# Patient Record
Sex: FEMALE | Race: BLACK OR AFRICAN AMERICAN | NOT HISPANIC OR LATINO | Employment: UNEMPLOYED | URBAN - METROPOLITAN AREA
[De-identification: names, ages, dates, MRNs, and addresses within clinical notes are randomized per-mention and may not be internally consistent; named-entity substitution may affect disease eponyms.]

---

## 2022-02-10 ENCOUNTER — HOSPITAL ENCOUNTER (INPATIENT)
Facility: HOSPITAL | Age: 57
LOS: 1 days | Discharge: HOME | DRG: 517 | End: 2022-02-11
Attending: EMERGENCY MEDICINE | Admitting: ORTHOPAEDIC SURGERY
Payer: MEDICAID

## 2022-02-10 ENCOUNTER — ANESTHESIA EVENT (OUTPATIENT)
Dept: SURGERY | Facility: HOSPITAL | Age: 57
DRG: 517 | End: 2022-02-10
Payer: MEDICAID

## 2022-02-10 ENCOUNTER — ANESTHESIA (OUTPATIENT)
Dept: SURGERY | Facility: HOSPITAL | Age: 57
DRG: 517 | End: 2022-02-10
Payer: MEDICAID

## 2022-02-10 DIAGNOSIS — S61.322A: Primary | ICD-10-CM

## 2022-02-10 DIAGNOSIS — T14.8XXA OPEN FRACTURE: ICD-10-CM

## 2022-02-10 DIAGNOSIS — Z01.810 PREOP CARDIOVASCULAR EXAM: ICD-10-CM

## 2022-02-10 DIAGNOSIS — S68.629A PARTIAL TRAUMATIC AMPUTATION OF FINGER THROUGH PHALANX, INITIAL ENCOUNTER: ICD-10-CM

## 2022-02-10 PROBLEM — S62.639B: Status: ACTIVE | Noted: 2022-02-10

## 2022-02-10 LAB
ALBUMIN SERPL BCP-MCNC: 3.8 G/DL (ref 3.5–5.2)
ALP SERPL-CCNC: 77 U/L (ref 55–135)
ALT SERPL W/O P-5'-P-CCNC: 14 U/L (ref 10–44)
ANION GAP SERPL CALC-SCNC: 8 MMOL/L (ref 8–16)
AST SERPL-CCNC: 28 U/L (ref 10–40)
BASOPHILS # BLD AUTO: 0.03 K/UL (ref 0–0.2)
BASOPHILS NFR BLD: 0.5 % (ref 0–1.9)
BILIRUB SERPL-MCNC: 0.4 MG/DL (ref 0.1–1)
BUN SERPL-MCNC: 12 MG/DL (ref 6–20)
CALCIUM SERPL-MCNC: 9.2 MG/DL (ref 8.7–10.5)
CHLORIDE SERPL-SCNC: 104 MMOL/L (ref 95–110)
CO2 SERPL-SCNC: 27 MMOL/L (ref 23–29)
CREAT SERPL-MCNC: 0.7 MG/DL (ref 0.5–1.4)
CTP QC/QA: YES
DIFFERENTIAL METHOD: ABNORMAL
EOSINOPHIL # BLD AUTO: 0.1 K/UL (ref 0–0.5)
EOSINOPHIL NFR BLD: 0.8 % (ref 0–8)
ERYTHROCYTE [DISTWIDTH] IN BLOOD BY AUTOMATED COUNT: 13 % (ref 11.5–14.5)
EST. GFR  (AFRICAN AMERICAN): >60 ML/MIN/1.73 M^2
EST. GFR  (NON AFRICAN AMERICAN): >60 ML/MIN/1.73 M^2
GLUCOSE SERPL-MCNC: 119 MG/DL (ref 70–110)
HCT VFR BLD AUTO: 38.8 % (ref 37–48.5)
HGB BLD-MCNC: 12.6 G/DL (ref 12–16)
IMM GRANULOCYTES # BLD AUTO: 0.02 K/UL (ref 0–0.04)
IMM GRANULOCYTES NFR BLD AUTO: 0.3 % (ref 0–0.5)
LYMPHOCYTES # BLD AUTO: 1.5 K/UL (ref 1–4.8)
LYMPHOCYTES NFR BLD: 23 % (ref 18–48)
MCH RBC QN AUTO: 31.4 PG (ref 27–31)
MCHC RBC AUTO-ENTMCNC: 32.5 G/DL (ref 32–36)
MCV RBC AUTO: 97 FL (ref 82–98)
MONOCYTES # BLD AUTO: 0.5 K/UL (ref 0.3–1)
MONOCYTES NFR BLD: 6.8 % (ref 4–15)
NEUTROPHILS # BLD AUTO: 4.5 K/UL (ref 1.8–7.7)
NEUTROPHILS NFR BLD: 68.6 % (ref 38–73)
NRBC BLD-RTO: 0 /100 WBC
PLATELET # BLD AUTO: 268 K/UL (ref 150–450)
PMV BLD AUTO: 10 FL (ref 9.2–12.9)
POTASSIUM SERPL-SCNC: 3.7 MMOL/L (ref 3.5–5.1)
PROT SERPL-MCNC: 7.6 G/DL (ref 6–8.4)
RBC # BLD AUTO: 4.01 M/UL (ref 4–5.4)
SARS-COV-2 RDRP RESP QL NAA+PROBE: NEGATIVE
SODIUM SERPL-SCNC: 139 MMOL/L (ref 136–145)
WBC # BLD AUTO: 6.6 K/UL (ref 3.9–12.7)

## 2022-02-10 PROCEDURE — 11000001 HC ACUTE MED/SURG PRIVATE ROOM

## 2022-02-10 PROCEDURE — 93005 ELECTROCARDIOGRAM TRACING: CPT

## 2022-02-10 PROCEDURE — 63600175 PHARM REV CODE 636 W HCPCS: Performed by: ORTHOPAEDIC SURGERY

## 2022-02-10 PROCEDURE — D9220A PRA ANESTHESIA: Mod: ANES,,, | Performed by: ANESTHESIOLOGY

## 2022-02-10 PROCEDURE — 71000033 HC RECOVERY, INTIAL HOUR: Performed by: ORTHOPAEDIC SURGERY

## 2022-02-10 PROCEDURE — 25000003 PHARM REV CODE 250: Performed by: ORTHOPAEDIC SURGERY

## 2022-02-10 PROCEDURE — 63600175 PHARM REV CODE 636 W HCPCS: Performed by: EMERGENCY MEDICINE

## 2022-02-10 PROCEDURE — 63600175 PHARM REV CODE 636 W HCPCS: Performed by: STUDENT IN AN ORGANIZED HEALTH CARE EDUCATION/TRAINING PROGRAM

## 2022-02-10 PROCEDURE — 93010 ELECTROCARDIOGRAM REPORT: CPT | Mod: ,,, | Performed by: INTERNAL MEDICINE

## 2022-02-10 PROCEDURE — 37000009 HC ANESTHESIA EA ADD 15 MINS: Performed by: ORTHOPAEDIC SURGERY

## 2022-02-10 PROCEDURE — 80053 COMPREHEN METABOLIC PANEL: CPT | Performed by: EMERGENCY MEDICINE

## 2022-02-10 PROCEDURE — 25000003 PHARM REV CODE 250: Performed by: EMERGENCY MEDICINE

## 2022-02-10 PROCEDURE — 25000003 PHARM REV CODE 250: Performed by: STUDENT IN AN ORGANIZED HEALTH CARE EDUCATION/TRAINING PROGRAM

## 2022-02-10 PROCEDURE — 93010 EKG 12-LEAD: ICD-10-PCS | Mod: ,,, | Performed by: INTERNAL MEDICINE

## 2022-02-10 PROCEDURE — 85025 COMPLETE CBC W/AUTO DIFF WBC: CPT | Performed by: EMERGENCY MEDICINE

## 2022-02-10 PROCEDURE — 36000706: Performed by: ORTHOPAEDIC SURGERY

## 2022-02-10 PROCEDURE — 96368 THER/DIAG CONCURRENT INF: CPT

## 2022-02-10 PROCEDURE — 36000707: Performed by: ORTHOPAEDIC SURGERY

## 2022-02-10 PROCEDURE — U0002 COVID-19 LAB TEST NON-CDC: HCPCS | Performed by: EMERGENCY MEDICINE

## 2022-02-10 PROCEDURE — D9220A PRA ANESTHESIA: Mod: CRNA,,, | Performed by: NURSE ANESTHETIST, CERTIFIED REGISTERED

## 2022-02-10 PROCEDURE — D9220A PRA ANESTHESIA: ICD-10-PCS | Mod: ANES,,, | Performed by: ANESTHESIOLOGY

## 2022-02-10 PROCEDURE — 37000008 HC ANESTHESIA 1ST 15 MINUTES: Performed by: ORTHOPAEDIC SURGERY

## 2022-02-10 PROCEDURE — 96372 THER/PROPH/DIAG INJ SC/IM: CPT

## 2022-02-10 PROCEDURE — 99291 CRITICAL CARE FIRST HOUR: CPT | Mod: 25

## 2022-02-10 PROCEDURE — D9220A PRA ANESTHESIA: ICD-10-PCS | Mod: CRNA,,, | Performed by: NURSE ANESTHETIST, CERTIFIED REGISTERED

## 2022-02-10 PROCEDURE — S0030 INJECTION, METRONIDAZOLE: HCPCS | Performed by: EMERGENCY MEDICINE

## 2022-02-10 PROCEDURE — 96365 THER/PROPH/DIAG IV INF INIT: CPT

## 2022-02-10 RX ORDER — MORPHINE SULFATE 4 MG/ML
4 INJECTION, SOLUTION INTRAMUSCULAR; INTRAVENOUS
Status: COMPLETED | OUTPATIENT
Start: 2022-02-10 | End: 2022-02-10

## 2022-02-10 RX ORDER — MORPHINE SULFATE 4 MG/ML
2 INJECTION, SOLUTION INTRAMUSCULAR; INTRAVENOUS
Status: DISCONTINUED | OUTPATIENT
Start: 2022-02-10 | End: 2022-02-11 | Stop reason: HOSPADM

## 2022-02-10 RX ORDER — HYDROMORPHONE HYDROCHLORIDE 2 MG/ML
0.2 INJECTION, SOLUTION INTRAMUSCULAR; INTRAVENOUS; SUBCUTANEOUS EVERY 5 MIN PRN
Status: DISCONTINUED | OUTPATIENT
Start: 2022-02-10 | End: 2022-02-10 | Stop reason: HOSPADM

## 2022-02-10 RX ORDER — PROPOFOL 10 MG/ML
VIAL (ML) INTRAVENOUS
Status: DISCONTINUED | OUTPATIENT
Start: 2022-02-10 | End: 2022-02-10

## 2022-02-10 RX ORDER — SODIUM CHLORIDE 0.9 % (FLUSH) 0.9 %
10 SYRINGE (ML) INJECTION
Status: DISCONTINUED | OUTPATIENT
Start: 2022-02-10 | End: 2022-02-10 | Stop reason: HOSPADM

## 2022-02-10 RX ORDER — BUPIVACAINE HYDROCHLORIDE 5 MG/ML
INJECTION, SOLUTION EPIDURAL; INTRACAUDAL
Status: DISCONTINUED | OUTPATIENT
Start: 2022-02-10 | End: 2022-02-10 | Stop reason: HOSPADM

## 2022-02-10 RX ORDER — LIDOCAINE HYDROCHLORIDE AND EPINEPHRINE 20; 10 MG/ML; UG/ML
10 INJECTION, SOLUTION INFILTRATION; PERINEURAL ONCE
Status: DISCONTINUED | OUTPATIENT
Start: 2022-02-10 | End: 2022-02-10

## 2022-02-10 RX ORDER — FENTANYL CITRATE 50 UG/ML
INJECTION, SOLUTION INTRAMUSCULAR; INTRAVENOUS
Status: DISCONTINUED | OUTPATIENT
Start: 2022-02-10 | End: 2022-02-10

## 2022-02-10 RX ORDER — MIDAZOLAM HYDROCHLORIDE 1 MG/ML
INJECTION INTRAMUSCULAR; INTRAVENOUS
Status: DISCONTINUED | OUTPATIENT
Start: 2022-02-10 | End: 2022-02-10

## 2022-02-10 RX ORDER — LIDOCAINE HYDROCHLORIDE AND EPINEPHRINE 10; 10 MG/ML; UG/ML
10 INJECTION, SOLUTION INFILTRATION; PERINEURAL ONCE
Status: DISCONTINUED | OUTPATIENT
Start: 2022-02-10 | End: 2022-02-10

## 2022-02-10 RX ORDER — PHENYLEPHRINE HYDROCHLORIDE 10 MG/ML
INJECTION INTRAVENOUS
Status: DISCONTINUED | OUTPATIENT
Start: 2022-02-10 | End: 2022-02-10

## 2022-02-10 RX ORDER — ONDANSETRON 2 MG/ML
INJECTION INTRAMUSCULAR; INTRAVENOUS
Status: DISCONTINUED | OUTPATIENT
Start: 2022-02-10 | End: 2022-02-10

## 2022-02-10 RX ORDER — LIDOCAINE HYDROCHLORIDE 20 MG/ML
INJECTION INTRAVENOUS
Status: DISCONTINUED | OUTPATIENT
Start: 2022-02-10 | End: 2022-02-10

## 2022-02-10 RX ORDER — ONDANSETRON 2 MG/ML
4 INJECTION INTRAMUSCULAR; INTRAVENOUS DAILY PRN
Status: DISCONTINUED | OUTPATIENT
Start: 2022-02-10 | End: 2022-02-10 | Stop reason: HOSPADM

## 2022-02-10 RX ORDER — HYDROCODONE BITARTRATE AND ACETAMINOPHEN 7.5; 325 MG/1; MG/1
1 TABLET ORAL EVERY 4 HOURS PRN
Status: DISCONTINUED | OUTPATIENT
Start: 2022-02-10 | End: 2022-02-11 | Stop reason: HOSPADM

## 2022-02-10 RX ORDER — METRONIDAZOLE 500 MG/100ML
500 INJECTION, SOLUTION INTRAVENOUS ONCE
Status: COMPLETED | OUTPATIENT
Start: 2022-02-10 | End: 2022-02-10

## 2022-02-10 RX ADMIN — FENTANYL CITRATE 25 MCG: 50 INJECTION, SOLUTION INTRAMUSCULAR; INTRAVENOUS at 02:02

## 2022-02-10 RX ADMIN — PIPERACILLIN AND TAZOBACTAM 4.5 G: 4; .5 INJECTION, POWDER, LYOPHILIZED, FOR SOLUTION INTRAVENOUS; PARENTERAL at 06:02

## 2022-02-10 RX ADMIN — PROPOFOL 70 MG: 10 INJECTION, EMULSION INTRAVENOUS at 02:02

## 2022-02-10 RX ADMIN — SODIUM CHLORIDE, SODIUM LACTATE, POTASSIUM CHLORIDE, AND CALCIUM CHLORIDE: .6; .31; .03; .02 INJECTION, SOLUTION INTRAVENOUS at 02:02

## 2022-02-10 RX ADMIN — PROPOFOL 40 MG: 10 INJECTION, EMULSION INTRAVENOUS at 02:02

## 2022-02-10 RX ADMIN — MORPHINE SULFATE 4 MG: 4 INJECTION INTRAVENOUS at 11:02

## 2022-02-10 RX ADMIN — MIDAZOLAM HYDROCHLORIDE 2 MG: 1 INJECTION, SOLUTION INTRAMUSCULAR; INTRAVENOUS at 02:02

## 2022-02-10 RX ADMIN — PROPOFOL 50 MG: 10 INJECTION, EMULSION INTRAVENOUS at 02:02

## 2022-02-10 RX ADMIN — CEFTRIAXONE 2 G: 2 INJECTION, SOLUTION INTRAVENOUS at 11:02

## 2022-02-10 RX ADMIN — METRONIDAZOLE 500 MG: 500 INJECTION, SOLUTION INTRAVENOUS at 11:02

## 2022-02-10 RX ADMIN — PROPOFOL 70 MG: 10 INJECTION, EMULSION INTRAVENOUS at 03:02

## 2022-02-10 RX ADMIN — LIDOCAINE HYDROCHLORIDE 100 MG: 20 INJECTION, SOLUTION INTRAVENOUS at 02:02

## 2022-02-10 RX ADMIN — ONDANSETRON 4 MG: 2 INJECTION, SOLUTION INTRAMUSCULAR; INTRAVENOUS at 02:02

## 2022-02-10 RX ADMIN — PHENYLEPHRINE HYDROCHLORIDE 200 MCG: 10 INJECTION INTRAVENOUS at 02:02

## 2022-02-10 RX ADMIN — PROPOFOL 30 MG: 10 INJECTION, EMULSION INTRAVENOUS at 02:02

## 2022-02-10 NOTE — ANESTHESIA PREPROCEDURE EVALUATION
02/10/2022  Jocelyne Garrett is a 56 y.o., female.  To undergo Procedure(s) (LRB):  INCISION AND DRAINAGE (Left)     Denies CP/SOB/GERD/MI/CVA/URI symptoms.  METS > 4  NPO > 8    Past Medical History:  History reviewed. No pertinent past medical history.    Past Surgical History:  History reviewed. No pertinent surgical history.    Social History:  Social History     Socioeconomic History    Marital status: Single   Tobacco Use    Smoking status: Current Every Day Smoker     Packs/day: 0.50     Types: Cigarettes   Substance and Sexual Activity    Alcohol use: Yes     Comment: occassionally    Drug use: Never       Medications:  No current facility-administered medications on file prior to encounter.     No current outpatient medications on file prior to encounter.       Allergies:  Review of patient's allergies indicates:  No Known Allergies    Active Problems:  There is no problem list on file for this patient.      Diagnostic Studies:    CBC:  Recent Labs   Lab 02/10/22  1113   WBC 6.60   RBC 4.01   HGB 12.6   HCT 38.8      MCV 97   MCH 31.4*   MCHC 32.5        CMP:  Recent Labs   Lab 02/10/22  1113   CALCIUM 9.2   PROT 7.6      K 3.7   CO2 27      BUN 12   CREATININE 0.7   ALKPHOS 77   ALT 14   AST 28   BILITOT 0.4     24 Hour Vitals:  Temp:  [36.9 °C (98.5 °F)] 36.9 °C (98.5 °F)  Pulse:  [119] 119  Resp:  [18] 18  SpO2:  [98 %] 98 %  BP: (133)/(81) 133/81   See Nursing Charting For Additional Vitals    Anesthesia Evaluation    I have reviewed the Patient Summary Reports.    I have reviewed the Nursing Notes.       Review of Systems  Anesthesia Hx:  No problems with previous Anesthesia   Denies Personal Hx of Anesthesia complications.   Social:  Non-Smoker    Hematology/Oncology:  Hematology Normal   Oncology Normal     EENT/Dental:EENT/Dental Normal   Cardiovascular:  Cardiovascular  Normal Exercise tolerance: good     Pulmonary:  Pulmonary Normal    Renal/:  Renal/ Normal     Hepatic/GI:  Hepatic/GI Normal    Musculoskeletal:   L hand laceration   Neurological:  Neurology Normal    Endocrine:  Endocrine Normal    Dermatological:  Skin Normal    Psych:  Psychiatric Normal           Physical Exam  General:  Obesity    Airway/Jaw/Neck:  AIRWAY FINDINGS: Normal MP2, TMD > 3FB, poor dentition    Dental:  DENTAL FINDINGS: Normal   Chest/Lungs:  Chest/Lungs Clear    Heart/Vascular:  Heart Findings: Normal       Mental Status:  Mental Status Findings:  Cooperative, Alert and Oriented         Anesthesia Plan  Type of Anesthesia, risks & benefits discussed:  Anesthesia Type:  general, MAC    Patient's Preference:   Plan Factors:          Intra-op Monitoring Plan: standard ASA monitors  Intra-op Monitoring Plan Comments:   Post Op Pain Control Plan: multimodal analgesia  Post Op Pain Control Plan Comments:     Induction:   IV  Beta Blocker:  Patient is not currently on a Beta-Blocker (No further documentation required).       Informed Consent: Patient understands risks and agrees with Anesthesia plan.  Questions answered. Anesthesia consent signed with patient.  ASA Score: 2     Day of Surgery Review of History & Physical:            Ready For Surgery From Anesthesia Perspective.

## 2022-02-10 NOTE — ED PROVIDER NOTES
Encounter Date: 2/10/2022       History     Chief Complaint   Patient presents with    Laceration     Patient reports cutting her finger on the lawnmower blade while cutting grass. Patient states that she believes the tip of her finger is off. She reports that her last Tetanus was about 10 years ago. Bleeding is controlled.      56-year-old female otherwise healthy presenting with laceration to right 2nd and 3rd fingers following accident with .  Patient reports she was attempting to clear something from the bottom of a lawnmower and the blade hit her fingers.  She notes significant opened fracture deformity to fingers.  Notes pain.  Reports bleeding controlled.  Denies fevers or chills.  Previously in usual state of health.        Review of patient's allergies indicates:  No Known Allergies  History reviewed. No pertinent past medical history.  History reviewed. No pertinent surgical history.  History reviewed. No pertinent family history.  Social History     Tobacco Use    Smoking status: Current Every Day Smoker     Packs/day: 0.50     Types: Cigarettes   Substance Use Topics    Alcohol use: Yes     Comment: occassionally    Drug use: Never     Review of Systems   Constitutional: Negative for fever.   Respiratory: Negative for shortness of breath.    Gastrointestinal: Negative for nausea and vomiting.   Musculoskeletal: Positive for arthralgias.   Skin: Positive for wound. Negative for rash.       Physical Exam     Initial Vitals [02/10/22 0915]   BP Pulse Resp Temp SpO2   133/81 (!) 119 18 98.5 °F (36.9 °C) 98 %      MAP       --         Physical Exam    Nursing note and vitals reviewed.  Constitutional: She appears well-developed and well-nourished. She is not diaphoretic. No distress.   HENT:   Head: Normocephalic and atraumatic.   Right Ear: External ear normal.   Left Ear: External ear normal.   Mouth/Throat: No oropharyngeal exudate.   Eyes: EOM are normal. Pupils are equal, round, and  reactive to light. Right eye exhibits no discharge. Left eye exhibits no discharge.   Neck: No JVD present.   Cardiovascular: Normal rate and intact distal pulses.   Pulmonary/Chest: No respiratory distress.   Abdominal: She exhibits no distension. There is no guarding.   Musculoskeletal:         General: Tenderness and edema present.      Comments: Significant laceration and deformity to distal 2nd and 3rd fingers     Neurological: She is alert and oriented to person, place, and time. GCS score is 15. GCS eye subscore is 4. GCS verbal subscore is 5. GCS motor subscore is 6.   Skin: Skin is warm and dry.   Significant laceration and mangled skin of distal 2nd and 3rd fingers   Psychiatric: She has a normal mood and affect. Her behavior is normal.         ED Course   Critical Care    Date/Time: 2/10/2022 10:45 AM  Performed by: Benedict Rowe MD  Authorized by: Benedict Rowe MD   Direct patient critical care time: 30 minutes  Total critical care time (exclusive of procedural time) : 30 minutes  Critical care was necessary to treat or prevent imminent or life-threatening deterioration of the following conditions: trauma.        Labs Reviewed   CBC W/ AUTO DIFFERENTIAL - Abnormal; Notable for the following components:       Result Value    MCH 31.4 (*)     All other components within normal limits   COMPREHENSIVE METABOLIC PANEL - Abnormal; Notable for the following components:    Glucose 119 (*)     All other components within normal limits   SARS-COV-2 RDRP GENE          Imaging Results          X-Ray Chest AP Portable (Final result)  Result time 02/10/22 12:08:22    Final result by Bautista Michel MD (02/10/22 12:08:22)                 Impression:      1. No acute cardiopulmonary process appreciated.      Electronically signed by: Bautista Michel  Date:    02/10/2022  Time:    12:08             Narrative:    EXAMINATION:  XR CHEST AP PORTABLE    CLINICAL HISTORY:  preop;    TECHNIQUE:  Single frontal  portable view of the chest was performed.    COMPARISON:  Chest radiograph 11/06/2009    FINDINGS:  Cardiomediastinal silhouette is within normal limits.    No focal consolidation, overt interstitial edema, sizable pleural effusion or pneumothorax.    Visualized osseous structures are grossly unremarkable.                               X-Ray Finger 2 or More Views Left (Final result)  Result time 02/10/22 10:21:56   Procedure changed from X-Ray Finger 2 or More Views Right     Final result by Timmy Milner DO (02/10/22 10:21:56)                 Impression:      Large lacerations of the distal long finger and distal ring finger with a severely comminuted and displaced fracture of the long finger distal phalanx and a minimally displaced fracture of the ring finger distal phalangeal tuft, and multiple radiopaque foreign bodies versus bone fragments in the soft tissues.      Electronically signed by: Timmy Milner  Date:    02/10/2022  Time:    10:21             Narrative:    EXAMINATION:  XR FINGER 2 OR MORE VIEWS LEFT    CLINICAL HISTORY:  laceration to finger by lawnmower. Unsure of type of lawnmower.;    TECHNIQUE:  PA, lateral, and oblique radiographs of the left hand.    COMPARISON:  None    FINDINGS:  There are large lacerations of the distal long finger and distal ring finger, with an associated severely comminuted and displaced fracture of the long finger distal phalanx and minimally displaced fracture of the ring finger distal phalangeal tuft.  There are radiopaque foci within the soft tissues compatible with foreign bodies or bone fragments.  The remaining visualized osseous structures are intact.  There is overlying dressing material.  Alignment is otherwise normal.                                             Medications   piperacillin-tazobactam 4.5 g in dextrose 5 % 100 mL IVPB (ready to mix system) (4.5 g Intravenous New Bag 2/10/22 5810)   HYDROcodone-acetaminophen 7.5-325 mg per tablet 1 tablet (has  no administration in time range)   morphine injection 2 mg (has no administration in time range)   morphine injection 4 mg (4 mg Intramuscular Given 2/10/22 1104)   cefTRIAXone (ROCEPHIN) 2 g/50 mL D5W IVPB (0 g Intravenous Stopped 2/10/22 1156)   metronidazole IVPB 500 mg (0 mg Intravenous Stopped 2/10/22 1334)     Medical Decision Making:   Initial Assessment:   56-year-old female presenting with long lower versus finger injury.  Distal 2nd and 3rd fingers split significantly to exposed bone.  Edges and margins of skin relatively well preserved.  Open bone.  X-ray confirms significant open fracture of both fingers.  Antibiotics ordered.  Pain relief ordered.  Will discuss with Ortho.  Will likely require thorough washout and closure.   ED Management:  Discussed case with orthopedics.  Patient to go to OR for washout.  Broad-spectrum antibiotics appropriate for contaminated wound ordered.  Pain controlled at this time.                        Clinical Impression:   Final diagnoses:  [S61.322A] Laceration of right middle finger with foreign body and damage to nail, initial encounter (Primary)  [T14.8XXA] Open fracture  [S68.629A] Partial traumatic amputation of finger through phalanx, initial encounter          ED Disposition Condition    Admit               Benedict Rowe MD  02/10/22 2213

## 2022-02-10 NOTE — OP NOTE
[facility] [place] [facility]  Surgery Department  Operative Note    SUMMARY     Date of Procedure: 2/10/2022     Procedure: Procedure(s) (LRB):  INCISION AND DRAINAGE (Left)  Long and ring finger open fractures. Foreign body removal left long and ring fingers    Surgeon(s) and Role:     * Akira Joy MD - Primary    Assisting Surgeon: Maynor    Pre-Operative Diagnosis: Open fractures Left long and ring finger distal phalanx    Post-Operative Diagnosis: Post-Op Diagnosis Codes:     Same, and foreign bodies left long and ring fingers    Anesthesia: Choice    Technical Procedures Used: I&D open fx. FB removal    Description of the Findings of the Procedure: grass in open fracture    Significant Surgical Tasks Conducted by the Assistant(s), if Applicable:  Position, prep, drape, retract, assist with foreign body room, assist with wound closure, dressing application and transfer of patient    Complications: No    Estimated Blood Loss (EBL):minimal           Implants: * No implants in log *    Specimens:   Specimen (24h ago, onward)            None                  Condition: Good    Disposition: PACU - hemodynamically stable.    Attestation: I performed the procedure.     Procedure in detail:    After proper consents were obtained, patient was taken to the operating room and administered MAC anesthesia.  10 cc Marcaine 0.5% without epi were infiltrated for digital blocks about the left long finger and left ring finger.  10 cc were placed at each finger.  Skin edges were then excised sharply and wounds were explored.  There were several pieces of grass and dirt in the ring finger.  There are only 2 of 3 pieces of grass in the long finger.  The distal phalanx of the long finger was not reconstructible or repairable.  The distal phalanx of the ring finger was largely intact and the skin flap was robust.  Foreign bodies were removed from both lacerations.  Wounds were then irrigated with 3 L normal saline with Betadine through  the Pulsavac.  Wounds were checked again and no further foreign bodies were identified.  Hemostasis was then achieved.  The ring finger was then closed primarily with 4-0 nylon sutures loosely through the skin.  Bone was debrided from the long finger and once remaining stable bone was achieved closure was performed.  4-0 nylon was utilized for skin closure as well in a loose fashion.  Sterile dressings were then applied tourniquet was deflated.  Patient was aroused operating room and transferred to recovery in stable condition

## 2022-02-10 NOTE — CONSULTS
Consult received    Patient with left hand finger tip injury from a lawnmower    She has had antibiotics  Patient to need I&D today will discuss availability with OR    Please keep patient NPO    Ceasar Williamson MD  [facility]

## 2022-02-10 NOTE — Clinical Note
Diagnosis: Preop cardiovascular exam [729052]   Admitting Provider:: SADIE CAIN [[id number]]   Future Attending Provider: SADIE CAIN [[id number]]   Reason for IP Medical Treatment  (Clinical interventions that can only be accomplished in the IP setting? ) :: surger   Estimated Length of Stay:: 1 midnight (only to be used with Inpatient only procedures)   I certify that Inpatient services for greater than or equal to 2 midnights are medically necessary:: Yes   Plans for Post-Acute care--if anticipated (pick the single best option):: A. No post acute care anticipated at this time

## 2022-02-10 NOTE — PROGRESS NOTES
Left hand lawnmower injury with partial amp left long and ring fingers. Tissues/Bone are not repairable. To OR for I&D open fractures and distal phalanx shortening with wound closure. Consents signed.

## 2022-02-10 NOTE — H&P
[facility] Emergency Dept  Orthopedics  H&P    Patient Name: Jocelyne Garrett  MRN: [id number]  Admission Date: 2/10/2022  Primary Care Provider: Primary Doctor No    Patient information was obtained from patient and ER records.     Subjective:     Principal Problem:<principal problem not specified> left hand pain    Chief Complaint:   Chief Complaint   Patient presents with    Laceration     Patient reports cutting her finger on the lawnmower blade while cutting grass. Patient states that she believes the tip of her finger is off. She reports that her last Tetanus was about 10 years ago. Bleeding is controlled.         HPI:     History reviewed. No pertinent past medical history.    History reviewed. No pertinent surgical history.    Review of patient's allergies indicates:  No Known Allergies    Current Facility-Administered Medications   Medication    LIDOcaine-EPINEPHrine 1%-1:100,000 injection 10 mL     No current outpatient medications on file.     Family History    None       Tobacco Use    Smoking status: Current Every Day Smoker     Packs/day: 0.50     Types: Cigarettes    Smokeless tobacco: Not on file   Substance and Sexual Activity    Alcohol use: Yes     Comment: occassionally    Drug use: Never    Sexual activity: Not on file     Review of Systems   HENT: Negative.    Eyes: Negative.    Cardiovascular: Negative.    Respiratory: Negative.    Endocrine: Negative.    Hematologic/Lymphatic: Negative.    Skin: Negative.    Musculoskeletal:        Left hand index and long finger lacerations   Gastrointestinal: Negative.    Genitourinary: Negative.    Neurological: Negative.      Objective:     Vital Signs (Most Recent):  Temp: 98.8 °F (37.1 °C) (02/10/22 1351)  Pulse: 66 (02/10/22 1351)  Resp: 20 (02/10/22 1351)  BP: (!) 102/55 (02/10/22 1351)  SpO2: 96 % (02/10/22 1200) Vital Signs (24h Range):  Temp:  [98.5 °F (36.9 °C)-98.8 °F (37.1 °C)] 98.8 °F (37.1 °C)  Pulse:  [] 66  Resp:  [18-20] 20  SpO2:   "[96 %-98 %] 96 %  BP: (102-133)/(55-81) 102/55     Weight: 77.1 kg (170 lb)  Height: 5' 3" (160 cm)  Body mass index is 30.11 kg/m².      Intake/Output Summary (Last 24 hours) at 2/10/2022 1425  Last data filed at 2/10/2022 1334  Gross per 24 hour   Intake 150 ml   Output --   Net 150 ml       General    Constitutional: She appears well-developed and well-nourished.   Eyes: Pupils are equal, round, and reactive to light.   Cardiovascular: Normal rate.    Abdominal: Soft.   Neurological: She is alert.   Psychiatric: She has a normal mood and affect.             Right Hand/Wrist Exam     Comments:          Left Hand/Wrist Exam     Comments:  Left hand: Lacerations present on distal ends of long and ring fingers with no brisk bleeding. Patient confirms sensation except at the distal ends of fingers. Limited ROM due to pain but able to flex and extend both PIP joints.             Significant Labs:   CBC:   Recent Labs   Lab 02/10/22  1113   WBC 6.60   HGB 12.6   HCT 38.8        All pertinent labs within the past 24 hours have been reviewed.    Significant Imaging: CT: I have reviewed all pertinent results/findings and my personal findings are:  none  X-Ray: I have reviewed all pertinent results/findings and my personal findings are:  below     Left fingers xray:   Large lacerations of the distal long finger and distal ring finger with a severely comminuted and displaced fracture of the long finger distal phalanx and a minimally displaced fracture of the ring finger distal phalangeal tuft, and multiple radiopaque foreign bodies versus bone fragments in the soft tissues.    Assessment/Plan:     There are no hospital problems to display for this patient.    A/P: Left index and long finger lacerations  1)NPO  2) to OR for I&D left long and ring fingers    VILMA Jasso  Orthopedics  [facility] [place] - Emergency Dept    "

## 2022-02-10 NOTE — CONSULTS
55 yo female with no sig pmhx presented with a CC of left long finger and ring finger lacerations after getting her fingers caught in the blades of her lawnmower today. She reports no other injury.   VSSAF    Left hand: Lacerations present on distal ends of long and ring fingers with no brisk bleeding. Patient confirms sensation except at the distal ends of fingers. Limited ROM due to pain but able to flex and extend both PIP joints.   Xray left long and ring fingers: Large lacerations of the distal long finger and distal ring finger with a severely comminuted and displaced fracture of the long finger distal phalanx and a minimally displaced fracture of the ring finger distal phalangeal tuft, and multiple radiopaque foreign bodies versus bone fragments in the soft tissues.  Hct: 38.8    A/P: Left long and ring finger lacerations.   1) NPO  2) to OR today for I&D left long and ring fingers with wound closure

## 2022-02-10 NOTE — PLAN OF CARE
Pt transported via stretcher to room 428. Report given. Awake alert and oriented. Vital signs stable denies any pain. Dressing clean dry an intact. Daughter updated .

## 2022-02-11 VITALS
RESPIRATION RATE: 18 BRPM | TEMPERATURE: 99 F | DIASTOLIC BLOOD PRESSURE: 59 MMHG | WEIGHT: 170 LBS | BODY MASS INDEX: 30.12 KG/M2 | OXYGEN SATURATION: 97 % | HEIGHT: 63 IN | HEART RATE: 63 BPM | SYSTOLIC BLOOD PRESSURE: 102 MMHG

## 2022-02-11 PROCEDURE — 25000003 PHARM REV CODE 250: Performed by: ORTHOPAEDIC SURGERY

## 2022-02-11 PROCEDURE — 63600175 PHARM REV CODE 636 W HCPCS: Performed by: ORTHOPAEDIC SURGERY

## 2022-02-11 RX ADMIN — HYDROCODONE BITARTRATE AND ACETAMINOPHEN 1 TABLET: 7.5; 325 TABLET ORAL at 06:02

## 2022-02-11 RX ADMIN — HYDROCODONE BITARTRATE AND ACETAMINOPHEN 1 TABLET: 7.5; 325 TABLET ORAL at 11:02

## 2022-02-11 RX ADMIN — HYDROCODONE BITARTRATE AND ACETAMINOPHEN 1 TABLET: 7.5; 325 TABLET ORAL at 12:02

## 2022-02-11 RX ADMIN — PIPERACILLIN AND TAZOBACTAM 4.5 G: 4; .5 INJECTION, POWDER, LYOPHILIZED, FOR SOLUTION INTRAVENOUS; PARENTERAL at 12:02

## 2022-02-11 NOTE — PLAN OF CARE
[facility] - Med Surg [place]  Initial Discharge Assessment       Primary Care Provider: Primary Doctor No    Admission Diagnosis: Preop cardiovascular exam [Z01.810]    Admission Date: 2/10/2022  Expected Discharge Date: 2/11/2022    Discharge Barriers Identified: (P) None    Payor: MEDICAID / Plan: [place] / Product Type: Managed Medicaid /     Extended Emergency Contact Information  Primary Emergency Contact: kaci orantes  Mobile Phone: [phone]  Relation: Daughter  Preferred language: English   needed? No    Discharge Plan A: (P) Home  Discharge Plan B: (P) Home with family    No Pharmacies Listed    Initial Assessment (most recent)       Adult Discharge Assessment - 02/11/22 1055          Discharge Assessment    Assessment Type Discharge Planning Assessment     Confirmed/corrected address, phone number and insurance Yes     Confirmed Demographics Correct on Facesheet     Source of Information patient     When was your last doctors appointment? --   Pt did not remember    Communicated VALERIO with patient/caregiver Yes     Reason For Admission Left finger fracture     Lives With alone     Facility Arrived From: Home     Do you expect to return to your current living situation? Yes     Do you have help at home or someone to help you manage your care at home? Yes     Who are your caregiver(s) and their phone number(s)? kaci orantes (Daughter)   [phone] (P)      Prior to hospitilization cognitive status: Alert/Oriented (P)      Current cognitive status: Alert/Oriented (P)      Walking or Climbing Stairs Difficulty none (P)      Dressing/Bathing Difficulty none (P)      Equipment Currently Used at Home none (P)      Readmission within 30 days? No (P)      Patient currently being followed by outpatient case management? No (P)      Do you currently have service(s) that help you manage your care at home? No (P)      Do you take prescription medications? Yes (P)      Do you  have prescription coverage? Yes (P)      Coverage [employer] (P)      Do you have any problems affording any of your prescribed medications? No (P)      Is the patient taking medications as prescribed? yes (P)      Who is going to help you get home at discharge? kaci orantes (Daughter)   [phone] (P)      How do you get to doctors appointments? family or friend will provide;public transportation (P)      Are you on dialysis? No (P)      Do you take coumadin? No (P)      Discharge Plan A Home (P)      Discharge Plan B Home with family (P)      DME Needed Upon Discharge  none (P)      Discharge Plan discussed with: Patient (P)      Discharge Barriers Identified None (P)

## 2022-02-11 NOTE — PROGRESS NOTES
POD#1  Patient doing well. Struggled with pain overnight. Dressing came off overnight.   VSSAF    Left hand: Surgical dressing removed. Incisions c/d/i. Redressed.  WBC: 6.60  Hct: 38.8    A/P: POD#1 I&D left LF, RF with wound closures  1) ok to discharge home- Rx's in chart  2) f/u with Dr Joy 2 weeks postop

## 2022-02-11 NOTE — PLAN OF CARE
[facility] [place]  Discharge Final Note    Patient cleared for discharge from case management standpoint. Patient stated her daughter will be her help at home.   Primary Care Provider: Ha Wong Jr, MD    Expected Discharge Date: 2/11/2022    Final Discharge Note (most recent)       Final Note - 02/11/22 1100          Final Note    Assessment Type Final Discharge Note (P)      Anticipated Discharge Disposition Home or Self Care     What phone number can be called within the next 1-3 days to see how you are doing after discharge? [phone] (P)      Hospital Resources/Appts/Education Provided Provided patient/caregiver with written discharge plan information;Appointments scheduled and added to AVS;Appointments scheduled by Navigator/Coordinator;Provided education on problems/symptoms using teachback (P)         Post-Acute Status    Discharge Delays None known at this time (P)                      Important Message from Medicare             Contact Info       Akira Joy MD   Specialty: Orthopedic Surgery    [address]  [place] [facility] [address]   Phone: [phone]       Next Steps: Go on 2/16/2022    Instructions: Follow up appointment @ 10:15am    Ha Wong Jr, MD   Specialty: Family Medicine   Relationship: PCP - General    [address]  [address]  [place] [address]   Phone: [phone]       Next Steps: Schedule an appointment as soon as possible for a visit        HOW TO MANAGE YOUR CARE  AT HOME:  TN taught Symptoms and Problems for finger fracture home care with pt.    HELP AT HOME TO ASSIST WITH PATIENT'S RECOVERY IS Kindra Quiroz, daughter.     Patient's preference for pharmacy is [facility] .      TN taught patient about things he is responsible for when discharged TO HELP WITH HIS RECOVERY:  How to Manage His Care At Home:  1. Getting his prescriptions filled.  2. Taking his medications as directed. DO NOT MISS ANY  DOSES!  3. Going to his follow-up doctor appointments.   .

## 2022-02-11 NOTE — PLAN OF CARE
Problem: Adult Inpatient Plan of Care  Goal: Plan of Care Review  Outcome: Ongoing, Progressing  Goal: Patient-Specific Goal (Individualized)  Outcome: Ongoing, Progressing  Goal: Absence of Hospital-Acquired Illness or Injury  Outcome: Ongoing, Progressing  Goal: Optimal Comfort and Wellbeing  Outcome: Ongoing, Progressing  Goal: Readiness for Transition of Care  Outcome: Ongoing, Progressing   Pt AOx4. PRN Pain med effective. No numbness, no tingling on the left hand. Dressing in place, dry and intact. No skin issues and no injury during shift. POC reviewed with pt. Education done and verbalized understanding. Bed locked & in lowest position. Call light at side.

## 2022-02-11 NOTE — PLAN OF CARE
Follow-up Information     Akira Joy MD. Go on 2/16/2022.    Specialty: Orthopedic Surgery  Why: Follow up appointment @ 10:15am  Contact information:  [address] [facility] [facility] [address] [facility]  [address]  [place] [address]  [phone]             Schedule an appointment as soon as possible for a visit with Ha Wong Jr, MD.    Specialty: Family Medicine  Contact information:  [address]  [address]  [place] [address]  [phone]                     WRITTEN HEALTHCARE DISCHARGE INFORMATION:      Things that YOU are RESPONSIBLE for to help Manage Your Care At Home:   1. Getting your prescriptions filled.   2. Taking you medications as directed. DO NOT MISS ANY DOSES!   3. Going to your follow-up doctor appointments. This is important because it allows the doctor to monitor your progress and to determine if any changes need to be made to your treatment plan.       If you are unable to make your follow up appointments, please call the number listed and reschedule this appointment.      ____________HELP AT HOME____________________     Experiencing any SYMPTOMS or PROBLEMS: YOU CAN   Schedule a same day appointment with your Primary Care Doctor or   you can call [facility] On Call Nurse Care Line for 24/7 assistance at [phone]     If you experience any SYMPTOMS or PROBLEMS that have become severe, Call 911 and come to your nearest Emergency Room.     Thank you for choosing [facility] and allowing us to care for you.   From your care management team:   You may receive a call from [facility] Discharge Department within 48-72 hours to help manage your care after discharge. Please try to make sure that you answer your phone for this important phone call.

## 2022-02-15 NOTE — DISCHARGE SUMMARY
[facility]  Orthopedics  Discharge Summary      Patient Name: Jocelyne Garrett  MRN: [id number]  Admission Date: 2/10/2022  Hospital Length of Stay: 1 days  Discharge Date and Time:  02/15/2022 2:28 PM  Attending Physician: No att. providers found   Discharging Provider: VILMA Jasso  Primary Care Provider: Ha Wong Jr, MD    HPI: left hand laceration after cutting fingers on lawnmower    Procedure(s) (LRB):  INCISION AND DRAINAGE (Left)      Hospital Course: Patient was admitted to orthopedic surgery service with a pre op diagnosis of left index and long finger lacerations. She had an I&D and wound closure. She tolerated the procedure well with no complications. No consults were made postop. She was discharged home to self care.         Significant Diagnostic Studies: Labs: stable upon discharge    Pending Diagnostic Studies:     None        Final Active Diagnoses:    Diagnosis Date Noted POA    PRINCIPAL PROBLEM:  Open fracture of base of distal phalanx of finger [S62.639B] 02/10/2022 Yes      Problems Resolved During this Admission:      Discharged Condition: stable    Disposition: Home or Self Care    Follow Up:   Follow-up Information     Akira Joy MD. Go on 2/16/2022.    Specialty: Orthopedic Surgery  Why: Follow up appointment @ 10:15am  Contact information:  [address]  [place] [facility] [address]  [phone]             Schedule an appointment as soon as possible for a visit with Ha Wong Jr, MD.    Specialty: Family Medicine  Contact information:  [address]  [place] [facility] [address]  [phone]                       Patient Instructions:   No discharge procedures on file.  Medications:  Reconciled Home Medications:      Medication List      You have not been prescribed any medications.         VILMA Jasso  Orthopedics  [facility]

## 2022-02-21 NOTE — ANESTHESIA POSTPROCEDURE EVALUATION
Anesthesia Post Evaluation    Patient: Jocelyne Tami    Procedure(s) Performed: Procedure(s) (LRB):  INCISION AND DRAINAGE (Left)    Final Anesthesia Type: MAC      Patient location during evaluation: PACU  Patient participation: Yes- Able to Participate  Level of consciousness: awake and alert and oriented  Post-procedure vital signs: reviewed and stable  Pain management: adequate  Airway patency: patent    PONV status at discharge: No PONV  Anesthetic complications: no      Cardiovascular status: hemodynamically stable and blood pressure returned to baseline  Respiratory status: spontaneous ventilation, room air and unassisted  Hydration status: euvolemic  Follow-up not needed.          Vitals Value Taken Time   /59 02/11/22 0747   Temp 37.1 °C (98.7 °F) 02/11/22 0747   Pulse 63 02/11/22 0747   Resp 18 02/11/22 1154   SpO2 97 % 02/11/22 0747         Event Time   Out of Recovery 16:15:53         Pain/Arleen Score: No data recorded

## 2023-07-23 ENCOUNTER — HOSPITAL ENCOUNTER (EMERGENCY)
Facility: HOSPITAL | Age: 58
Discharge: HOME | End: 2023-07-24
Attending: STUDENT IN AN ORGANIZED HEALTH CARE EDUCATION/TRAINING PROGRAM
Payer: MEDICAID

## 2023-07-23 DIAGNOSIS — R51.9 NONINTRACTABLE HEADACHE, UNSPECIFIED CHRONICITY PATTERN, UNSPECIFIED HEADACHE TYPE: Primary | ICD-10-CM

## 2023-07-23 DIAGNOSIS — R42 DIZZINESS: ICD-10-CM

## 2023-07-23 DIAGNOSIS — R11.2 NAUSEA AND VOMITING, UNSPECIFIED VOMITING TYPE: ICD-10-CM

## 2023-07-23 DIAGNOSIS — R06.02 SOB (SHORTNESS OF BREATH): ICD-10-CM

## 2023-07-23 LAB
ALBUMIN SERPL BCP-MCNC: 3.8 G/DL (ref 3.5–5.2)
ALP SERPL-CCNC: 64 U/L (ref 55–135)
ALT SERPL W/O P-5'-P-CCNC: 8 U/L (ref 10–44)
ANION GAP SERPL CALC-SCNC: 10 MMOL/L (ref 8–16)
AST SERPL-CCNC: 19 U/L (ref 10–40)
BASOPHILS # BLD AUTO: 0.02 K/UL (ref 0–0.2)
BASOPHILS NFR BLD: 0.6 % (ref 0–1.9)
BILIRUB SERPL-MCNC: 0.3 MG/DL (ref 0.1–1)
BILIRUB UR QL STRIP: NEGATIVE
BUN SERPL-MCNC: 10 MG/DL (ref 6–20)
CALCIUM SERPL-MCNC: 9.5 MG/DL (ref 8.7–10.5)
CHLORIDE SERPL-SCNC: 106 MMOL/L (ref 95–110)
CLARITY UR: CLEAR
CO2 SERPL-SCNC: 24 MMOL/L (ref 23–29)
COLOR UR: YELLOW
CREAT SERPL-MCNC: 0.7 MG/DL (ref 0.5–1.4)
DIFFERENTIAL METHOD: ABNORMAL
EOSINOPHIL # BLD AUTO: 0.1 K/UL (ref 0–0.5)
EOSINOPHIL NFR BLD: 2.8 % (ref 0–8)
ERYTHROCYTE [DISTWIDTH] IN BLOOD BY AUTOMATED COUNT: 12.8 % (ref 11.5–14.5)
EST. GFR  (NO RACE VARIABLE): >60 ML/MIN/1.73 M^2
GLUCOSE SERPL-MCNC: 96 MG/DL (ref 70–110)
GLUCOSE UR QL STRIP: NEGATIVE
HCT VFR BLD AUTO: 34.6 % (ref 37–48.5)
HGB BLD-MCNC: 11.4 G/DL (ref 12–16)
HGB UR QL STRIP: NEGATIVE
IMM GRANULOCYTES # BLD AUTO: 0 K/UL (ref 0–0.04)
IMM GRANULOCYTES NFR BLD AUTO: 0 % (ref 0–0.5)
KETONES UR QL STRIP: NEGATIVE
LEUKOCYTE ESTERASE UR QL STRIP: NEGATIVE
LIPASE SERPL-CCNC: 71 U/L (ref 4–60)
LYMPHOCYTES # BLD AUTO: 1.4 K/UL (ref 1–4.8)
LYMPHOCYTES NFR BLD: 39.9 % (ref 18–48)
MCH RBC QN AUTO: 30.8 PG (ref 27–31)
MCHC RBC AUTO-ENTMCNC: 32.9 G/DL (ref 32–36)
MCV RBC AUTO: 94 FL (ref 82–98)
MONOCYTES # BLD AUTO: 0.2 K/UL (ref 0.3–1)
MONOCYTES NFR BLD: 6.2 % (ref 4–15)
NEUTROPHILS # BLD AUTO: 1.8 K/UL (ref 1.8–7.7)
NEUTROPHILS NFR BLD: 50.5 % (ref 38–73)
NITRITE UR QL STRIP: NEGATIVE
NRBC BLD-RTO: 0 /100 WBC
PH UR STRIP: 6 [PH] (ref 5–8)
PLATELET # BLD AUTO: 220 K/UL (ref 150–450)
PMV BLD AUTO: 10 FL (ref 9.2–12.9)
POTASSIUM SERPL-SCNC: 3.5 MMOL/L (ref 3.5–5.1)
PROT SERPL-MCNC: 7 G/DL (ref 6–8.4)
PROT UR QL STRIP: NEGATIVE
RBC # BLD AUTO: 3.7 M/UL (ref 4–5.4)
SODIUM SERPL-SCNC: 140 MMOL/L (ref 136–145)
SP GR UR STRIP: 1.01 (ref 1–1.03)
TROPONIN I SERPL DL<=0.01 NG/ML-MCNC: 0.01 NG/ML (ref 0–0.03)
URN SPEC COLLECT METH UR: NORMAL
UROBILINOGEN UR STRIP-ACNC: NEGATIVE EU/DL
WBC # BLD AUTO: 3.56 K/UL (ref 3.9–12.7)

## 2023-07-23 PROCEDURE — 81003 URINALYSIS AUTO W/O SCOPE: CPT | Performed by: PHYSICIAN ASSISTANT

## 2023-07-23 PROCEDURE — 93010 ELECTROCARDIOGRAM REPORT: CPT | Mod: ,,, | Performed by: INTERNAL MEDICINE

## 2023-07-23 PROCEDURE — 83690 ASSAY OF LIPASE: CPT | Performed by: PHYSICIAN ASSISTANT

## 2023-07-23 PROCEDURE — 83880 ASSAY OF NATRIURETIC PEPTIDE: CPT | Performed by: PHYSICIAN ASSISTANT

## 2023-07-23 PROCEDURE — 99285 EMERGENCY DEPT VISIT HI MDM: CPT | Mod: 25

## 2023-07-23 PROCEDURE — 93010 EKG 12-LEAD: ICD-10-PCS | Mod: ,,, | Performed by: INTERNAL MEDICINE

## 2023-07-23 PROCEDURE — 96361 HYDRATE IV INFUSION ADD-ON: CPT

## 2023-07-23 PROCEDURE — 96374 THER/PROPH/DIAG INJ IV PUSH: CPT

## 2023-07-23 PROCEDURE — 84484 ASSAY OF TROPONIN QUANT: CPT | Performed by: PHYSICIAN ASSISTANT

## 2023-07-23 PROCEDURE — 93005 ELECTROCARDIOGRAM TRACING: CPT

## 2023-07-23 PROCEDURE — 96375 TX/PRO/DX INJ NEW DRUG ADDON: CPT

## 2023-07-23 PROCEDURE — 85025 COMPLETE CBC W/AUTO DIFF WBC: CPT | Performed by: PHYSICIAN ASSISTANT

## 2023-07-23 PROCEDURE — 80053 COMPREHEN METABOLIC PANEL: CPT | Performed by: PHYSICIAN ASSISTANT

## 2023-07-23 PROCEDURE — 63600175 PHARM REV CODE 636 W HCPCS: Performed by: PHYSICIAN ASSISTANT

## 2023-07-23 RX ORDER — KETOROLAC TROMETHAMINE 30 MG/ML
10 INJECTION, SOLUTION INTRAMUSCULAR; INTRAVENOUS
Status: COMPLETED | OUTPATIENT
Start: 2023-07-23 | End: 2023-07-23

## 2023-07-23 RX ORDER — METOCLOPRAMIDE HYDROCHLORIDE 5 MG/ML
10 INJECTION INTRAMUSCULAR; INTRAVENOUS
Status: COMPLETED | OUTPATIENT
Start: 2023-07-23 | End: 2023-07-23

## 2023-07-23 RX ADMIN — SODIUM CHLORIDE, POTASSIUM CHLORIDE, SODIUM LACTATE AND CALCIUM CHLORIDE 1000 ML: 600; 310; 30; 20 INJECTION, SOLUTION INTRAVENOUS at 11:07

## 2023-07-23 RX ADMIN — KETOROLAC TROMETHAMINE 10 MG: 30 INJECTION, SOLUTION INTRAMUSCULAR; INTRAVENOUS at 11:07

## 2023-07-23 RX ADMIN — METOCLOPRAMIDE 10 MG: 5 INJECTION, SOLUTION INTRAMUSCULAR; INTRAVENOUS at 11:07

## 2023-07-23 NOTE — Clinical Note
"Jocelyne "Jocelyne" Tami was seen and treated in our emergency department on 7/23/2023.  She may return to work on 07/27/2023.       If you have any questions or concerns, please don't hesitate to call.      Laron Jane RN    "

## 2023-07-24 VITALS
BODY MASS INDEX: 27.6 KG/M2 | HEART RATE: 58 BPM | RESPIRATION RATE: 16 BRPM | TEMPERATURE: 99 F | SYSTOLIC BLOOD PRESSURE: 110 MMHG | WEIGHT: 150 LBS | HEIGHT: 62 IN | OXYGEN SATURATION: 99 % | DIASTOLIC BLOOD PRESSURE: 62 MMHG

## 2023-07-24 LAB — BNP SERPL-MCNC: 13 PG/ML (ref 0–99)

## 2023-07-24 RX ORDER — DICYCLOMINE HYDROCHLORIDE 20 MG/1
20 TABLET ORAL 2 TIMES DAILY
Qty: 20 TABLET | Refills: 0 | Status: SHIPPED | OUTPATIENT
Start: 2023-07-24 | End: 2023-08-03

## 2023-07-24 RX ORDER — ONDANSETRON 4 MG/1
4 TABLET, FILM COATED ORAL 3 TIMES DAILY PRN
Qty: 15 TABLET | Refills: 0 | Status: SHIPPED | OUTPATIENT
Start: 2023-07-24

## 2023-07-24 NOTE — DISCHARGE INSTRUCTIONS

## 2023-07-24 NOTE — ED TRIAGE NOTES
Jocelyne Garrett is a 57 yo female to ED c/o frontal HA, n/v x1 day.  4 episodes of vomiting today. Pt works at [employer] and was exposed to sick co-workers.

## 2023-07-24 NOTE — ED PROVIDER NOTES
Encounter Date: 7/23/2023       History     Chief Complaint   Patient presents with    Emesis     Pt comes to the ED with complaints of vomiting and diarrhea.  Pt's symptoms started yesterday.  Pt says she has vomited 4 times since 2200.  Pt says emesis is clear.  Pt also complaining of headache and dizziness.  Pt denies abdominal pain or constipation.       58-year-old female presents with nausea vomiting and diarrhea.  Symptoms began yesterday.  She also endorses shortness of breath that began today.  She did not take any medication over-the-counter.  She denies any chest pain.  She denies any fever chills or weakness.  She denies any trauma or injury.  She is ambulatory.  She appears well on exam and nontoxic.    Review of patient's allergies indicates:  No Known Allergies  History reviewed. No pertinent past medical history.  Past Surgical History:   Procedure Laterality Date    INCISION AND DRAINAGE Left 2/10/2022    Procedure: INCISION AND DRAINAGE;  Surgeon: Akira Joy MD;  Location: [facility] OR;  Service: Orthopedics;  Laterality: Left;  L-hand     History reviewed. No pertinent family history.  Social History     Tobacco Use    Smoking status: Every Day     Packs/day: 0.50     Types: Cigarettes   Substance Use Topics    Alcohol use: Yes     Comment: occassionally    Drug use: Never     Review of Systems   Constitutional:  Negative for fever.   HENT:  Negative for sore throat.    Respiratory:  Positive for shortness of breath.    Cardiovascular:  Negative for chest pain.   Gastrointestinal:  Positive for diarrhea, nausea and vomiting.   Genitourinary:  Negative for dysuria.   Musculoskeletal:  Negative for back pain.   Skin:  Negative for rash.   Neurological:  Negative for weakness.   Hematological:  Does not bruise/bleed easily.     Physical Exam     Initial Vitals [07/23/23 2259]   BP Pulse Resp Temp SpO2   113/64 63 16 98.9 °F (37.2 °C) 98 %      MAP       --         Physical Exam    Constitutional:  Vital signs are normal. She appears well-developed and well-nourished.   HENT:   Head: Normocephalic and atraumatic.   Right Ear: Hearing normal.   Left Ear: Hearing normal.   Eyes: Conjunctivae are normal.   Cardiovascular:  Normal rate and regular rhythm.           No lower extremity edema, no JVD   Pulmonary/Chest:   Clear on exam, even and unlabored   Abdominal: Abdomen is soft. Bowel sounds are normal.   Soft and benign abdomen, no tenderness, no rebound, no guarding   Musculoskeletal:         General: Normal range of motion.     Neurological: She is alert and oriented to person, place, and time.   Benign neurological assessment without red flags or deficits   Skin: Skin is warm and intact.   Psychiatric: She has a normal mood and affect. Her speech is normal and behavior is normal. Cognition and memory are normal.       ED Course   Procedures  Labs Reviewed   CBC W/ AUTO DIFFERENTIAL - Abnormal; Notable for the following components:       Result Value    WBC 3.56 (*)     RBC 3.70 (*)     Hemoglobin 11.4 (*)     Hematocrit 34.6 (*)     Mono # 0.2 (*)     All other components within normal limits   COMPREHENSIVE METABOLIC PANEL - Abnormal; Notable for the following components:    ALT 8 (*)     All other components within normal limits   LIPASE - Abnormal; Notable for the following components:    Lipase 71 (*)     All other components within normal limits   URINALYSIS, REFLEX TO URINE CULTURE    Narrative:     Specimen Source->Urine   TROPONIN I   B-TYPE NATRIURETIC PEPTIDE          Imaging Results              X-Ray Chest AP Portable (Final result)  Result time 07/23/23 23:40:12      Final result by Allegra Aparicio MD (07/23/23 23:40:12)                   Impression:      No acute intrathoracic abnormality identified on this single radiographic view of the chest.      Electronically signed by: Allegra Aparicio MD  Date:    07/23/2023  Time:    23:40               Narrative:    EXAMINATION:  XR CHEST AP  PORTABLE    CLINICAL HISTORY:  Shortness of breath    TECHNIQUE:  Single frontal view of the chest was performed.    COMPARISON:  02/10/2022    FINDINGS:  The cardiomediastinal silhouette is within normal limits of size and configuration.  Mediastinal structures are midline.  The lungs appear symmetrically expanded without definite evidence of confluent airspace consolidation, significant volume of pleural fluid or pneumothorax.  Visualized osseous structures are intact.                                       Medications   lactated ringers bolus 1,000 mL (1,000 mLs Intravenous New Bag 7/23/23 2322)   metoclopramide HCl injection 10 mg (10 mg Intravenous Given 7/23/23 2323)   ketorolac injection 9.999 mg (9.999 mg Intravenous Given 7/23/23 2324)     Medical Decision Making:   History:   Old Medical Records: I decided to obtain old medical records.  Old Records Summarized: records from clinic visits.  Initial Assessment:   58-year-old female with multiple complaints  Differential Diagnosis:   Gastroenteritis, gastritis, dehydration, electrolyte derangement, cardiac arrhythmia, pneumonia, CHF, cardiomegaly  Independently Interpreted Test(s):   I have ordered and independently interpreted EKG Reading(s) - see summary below       <> Summary of EKG Reading(s): Sinus miko, No STEMI  ED Management:  Plan:  I will get a workup including routine labs, cardiac markers chest x-ray and EKG.  Will treat the patient with fluids and antiemetic medications while in the ED and Toradol.  Will treat with the headache cocktail including Toradol and Reglan.    12:00 a.m. assessment   Resolved, abdominal exam is benign.  Nausea improved.  No acute findings on labs.  Troponin normal, patient without chest pain, symptoms present for the whole day, I do not feel that repeat troponin is necessary.  Chest x-ray reviewed without acute intrathoracic findings.  No pneumonia or cardiomegaly.  EKG without acute ischemic findings.  Suspect symptoms  secondary to a viral GI illness.  Will discharge home with Bentyl and Zofran, return precautions given.  Stable for discharge.Dictation #1  MRN:[id number]  CSN:[id number]                         Clinical Impression:   Final diagnoses:  [R42] Dizziness  [R06.02] SOB (shortness of breath)  [R51.9] Nonintractable headache, unspecified chronicity pattern, unspecified headache type (Primary)  [R11.2] Nausea and vomiting, unspecified vomiting type        ED Disposition Condition    Discharge Stable          ED Prescriptions       Medication Sig Dispense Start Date End Date Auth. Provider    ondansetron (ZOFRAN) 4 MG tablet Take 1 tablet (4 mg total) by mouth 3 (three) times daily as needed for Nausea. 15 tablet 7/24/2023 -- Andres Ortega PA-C    dicyclomine (BENTYL) 20 mg tablet Take 1 tablet (20 mg total) by mouth 2 (two) times daily. for 10 days 20 tablet 7/24/2023 8/3/2023 Andres Ortega PA-C          Follow-up Information    None          Andres Ortega PA-C  07/24/23 0008

## 2024-12-25 ENCOUNTER — HOSPITAL ENCOUNTER (EMERGENCY)
Facility: HOSPITAL | Age: 59
Discharge: HOME | End: 2024-12-25
Attending: EMERGENCY MEDICINE

## 2024-12-25 VITALS
OXYGEN SATURATION: 98 % | RESPIRATION RATE: 18 BRPM | BODY MASS INDEX: 31.89 KG/M2 | SYSTOLIC BLOOD PRESSURE: 123 MMHG | HEIGHT: 63 IN | HEART RATE: 70 BPM | WEIGHT: 180 LBS | TEMPERATURE: 98 F | DIASTOLIC BLOOD PRESSURE: 67 MMHG

## 2024-12-25 DIAGNOSIS — K04.7 DENTAL ABSCESS: ICD-10-CM

## 2024-12-25 DIAGNOSIS — K02.9 PAIN DUE TO DENTAL CARIES: Primary | ICD-10-CM

## 2024-12-25 PROCEDURE — 99284 EMERGENCY DEPT VISIT MOD MDM: CPT

## 2024-12-25 PROCEDURE — 25000003 PHARM REV CODE 250: Performed by: PHYSICIAN ASSISTANT

## 2024-12-25 RX ORDER — TRAMADOL HYDROCHLORIDE 50 MG/1
50 TABLET ORAL EVERY 6 HOURS PRN
Qty: 12 TABLET | Refills: 0 | Status: SHIPPED | OUTPATIENT
Start: 2024-12-25

## 2024-12-25 RX ORDER — IBUPROFEN 600 MG/1
600 TABLET ORAL
Status: COMPLETED | OUTPATIENT
Start: 2024-12-25 | End: 2024-12-25

## 2024-12-25 RX ORDER — AMOXICILLIN AND CLAVULANATE POTASSIUM 875; 125 MG/1; MG/1
1 TABLET, FILM COATED ORAL
Status: COMPLETED | OUTPATIENT
Start: 2024-12-25 | End: 2024-12-25

## 2024-12-25 RX ORDER — ACETAMINOPHEN 500 MG
1000 TABLET ORAL
Status: COMPLETED | OUTPATIENT
Start: 2024-12-25 | End: 2024-12-25

## 2024-12-25 RX ORDER — AMOXICILLIN 500 MG/1
1000 CAPSULE ORAL EVERY 12 HOURS
Qty: 40 CAPSULE | Refills: 0 | Status: SHIPPED | OUTPATIENT
Start: 2024-12-25 | End: 2025-01-04

## 2024-12-25 RX ADMIN — ACETAMINOPHEN 1000 MG: 500 TABLET ORAL at 06:12

## 2024-12-25 RX ADMIN — AMOXICILLIN AND CLAVULANATE POTASSIUM 1 TABLET: 875; 125 TABLET, FILM COATED ORAL at 06:12

## 2024-12-25 RX ADMIN — IBUPROFEN 600 MG: 600 TABLET, FILM COATED ORAL at 06:12

## 2024-12-25 NOTE — DISCHARGE INSTRUCTIONS
Please follow-up with the dentist as soon as possible.  You may also follow up with your primary care doctor.  Tramadol for pain.  Also use ibuprofen or Tylenol.  Amoxicillin as directed.  If you get worse you may wish to follow up at the [facility] [place] Emergency room.  This is the only place [facility] where there is oral maxillofacial surgery coverage.

## 2024-12-25 NOTE — ED PROVIDER NOTES
Encounter Date: 12/25/2024    SCRIBE #1 NOTE: I, Huyen Doan, am scribing for, and in the presence of,  Julian Mi MD. I have scribed the following portions of the note - Other sections scribed: HPI, ROS.       History     Chief Complaint   Patient presents with    Dental Pain     X 2 days to a cracked tooth on bottom left     HPI: 59 year old female, with no PMHx, presents to the ED for evaluation of pain/swelling to the left lower jaw, symptoms onset x 3 days. Reports associated left ear pain.  States she has loose teeth to the left lower jaw that need to be pulled. No other alleviating or exacerbating factors. Patient denies cough, shortness of breath, chest pain, fever, chills, abdominal pain, nausea, vomiting, diarrhea, dysuria, headaches, congestion, sore throat, arm or leg trouble, eye pain, rash, or other associated symptoms. This is the extent of the patient's complaints in the ED.     The history is provided by the patient. No  was used.     Review of patient's allergies indicates:  No Known Allergies  History reviewed. No pertinent past medical history.  Past Surgical History:   Procedure Laterality Date    INCISION AND DRAINAGE Left 2/10/2022    Procedure: INCISION AND DRAINAGE;  Surgeon: Akira Joy MD;  Location: [facility] OR;  Service: Orthopedics;  Laterality: Left;  L-hand     No family history on file.  Social History     Tobacco Use    Smoking status: Every Day     Current packs/day: 0.50     Types: Cigarettes   Substance Use Topics    Alcohol use: Yes     Comment: occassionally    Drug use: Never     Review of Systems   Constitutional:  Negative for chills, diaphoresis and fever.   HENT:  Positive for ear pain (left) and facial swelling (left lower jaw). Negative for sore throat.         (+) pain to left lower jaw   Eyes:  Negative for pain.   Respiratory:  Negative for cough and shortness of breath.    Cardiovascular:  Negative for chest pain.   Gastrointestinal:   Negative for abdominal pain, diarrhea, nausea and vomiting.   Genitourinary:  Negative for dysuria.   Musculoskeletal:  Negative for back pain.        (-) Arm or leg trouble.    Skin:  Negative for rash.   Neurological:  Negative for headaches.   Psychiatric/Behavioral:  Negative for confusion.        Physical Exam     Initial Vitals [12/25/24 0553]   BP Pulse Resp Temp SpO2   123/67 72 16 98.8 °F (37.1 °C) 100 %      MAP       --         Physical Exam  The patient was examined specifically for the following:   General:No significant distress, Good color, Warm and dry. Head and neck:Scalp atraumatic, Neck supple. Neurological:Appropriate conversation, Gross motor deficits. Eyes:Conjugate gaze, Clear corneas. ENT: No epistaxis. Cardiac: Regular rate and rhythm, Grossly normal heart tones. Pulmonary: Wheezing, Rales. Gastrointestinal: Abdominal tenderness, Abdominal distention. Musculoskeletal: Extremity deformity, Apparent pain with range of motion of the joints. Skin: Rash.   The findings on examination were normal except for the following:  The patient has swelling of the left lower mandibular face.  No trismus dysphagia.  The patient is afebrile.  She is not tachycardic.  The patient has loose teeth in the left lower jaw.  Left tympanic membrane is clear.  ED Course   Procedures  Labs Reviewed - No data to display       Imaging Results    None          Medications   ibuprofen tablet 600 mg (600 mg Oral Given 12/25/24 0628)   acetaminophen tablet 1,000 mg (1,000 mg Oral Given 12/25/24 0628)   amoxicillin-clavulanate 875-125mg per tablet 1 tablet (1 tablet Oral Given 12/25/24 0629)     Medical Decision Making  Risk  Prescription drug management.    Given the above this patient may have an odontogenic abscess.  Cellulitis is also consideration.  I offered the patient incision and drainage.  She declined wishing instead to be treated with antibiotics and pain medicine.  I will discharge on amoxicillin and tramadol.  I  doubt fascial space abscess and systemic infection.         Scribe Attestation:   Scribe #1: I performed the above scribed service and the documentation accurately describes the services I performed. I attest to the accuracy of the note.                             Please note that the documentation on this chart was provided by the scribe above on the date of service noted above, and that the documentation in the chart accurately reflects the work and decisions made by me alone.  Signed, Dr. Mi  Clinical Impression:  Final diagnoses:  [K02.9] Pain due to dental caries (Primary)  [K04.7] Dental abscess          ED Disposition Condition    Discharge Stable          ED Prescriptions       Medication Sig Dispense Start Date End Date Auth. Provider    amoxicillin (AMOXIL) 500 MG capsule Take 2 capsules (1,000 mg total) by mouth every 12 (twelve) hours. for 20 doses 40 capsule 12/25/2024 1/4/2025 Julian Mi MD    traMADoL (ULTRAM) 50 mg tablet Take 1 tablet (50 mg total) by mouth every 6 (six) hours as needed. 12 tablet 12/25/2024 -- Julian Mi MD          Follow-up Information       Follow up With Specialties Details Why Contact Info    Ha Wong Jr., MD Hospitalist, Family Medicine In 1 week  [address] [facility] [address]  [phone]               Julian Mi MD  12/26/24 0248

## (undated) DEVICE — SUT ETHILON 4-0 PS2 18 BLK

## (undated) DEVICE — PAD CAST SPECIALIST STRL 4

## (undated) DEVICE — BANDAGE RUBBER ELAS STD 3X5YD

## (undated) DEVICE — APPLICATOR CHLORAPREP ORN 26ML

## (undated) DEVICE — ELECTRODE REM PLYHSV RETURN 9

## (undated) DEVICE — DRESSING GAUZE XEROFORM 5X9

## (undated) DEVICE — TOURNIQUET SB QC DP 18X4IN

## (undated) DEVICE — SEE MEDLINE ITEM 157110

## (undated) DEVICE — SOL 9P NACL IRR PIC IL

## (undated) DEVICE — GAUZE SPONGE 4X4 12PLY

## (undated) DEVICE — GLOVE BIOGEL ORTHOPEDIC 8

## (undated) DEVICE — SUT 4/0 18IN ETHILON BL P3

## (undated) DEVICE — Device

## (undated) DEVICE — PAD CAST SPECIALIST STRL 3

## (undated) DEVICE — SEE MEDLINE ITEM 107746

## (undated) DEVICE — DRAPE PLASTIC U 60X72

## (undated) DEVICE — GLOVE SURGICAL LATEX SZ 8

## (undated) DEVICE — UNDERGLOVE BIOGEL PI SZ 6.5 LF

## (undated) DEVICE — GLOVE SURGICAL LATEX SZ 6.5

## (undated) DEVICE — SEE MEDLINE ITEM 157173